# Patient Record
Sex: FEMALE | Race: WHITE
[De-identification: names, ages, dates, MRNs, and addresses within clinical notes are randomized per-mention and may not be internally consistent; named-entity substitution may affect disease eponyms.]

---

## 2021-07-21 LAB
ALBUMIN SERPL BCP-MCNC: 3.8 G/DL (ref 3.4–5)
ALBUMIN/GLOB SERPL: 1 {RATIO} (ref 1.1–1.5)
ALP SERPL-CCNC: 99 IU/L (ref 46–116)
ALT SERPL W P-5'-P-CCNC: 32 U/L (ref 30–65)
ANION GAP SERPL CALCULATED.3IONS-SCNC: 10 MMOL/L (ref 8–16)
AST SERPL W P-5'-P-CCNC: 19 U/L (ref 10–37)
BASOPHILS # BLD AUTO: 0 X10'3 (ref 0–0.2)
BASOPHILS NFR BLD AUTO: 0.3 % (ref 0–1)
BILIRUB SERPL-MCNC: 0.6 MG/DL (ref 0–1)
BUN SERPL-MCNC: 15 MG/DL (ref 7–18)
BUN/CREAT SERPL: 17 (ref 6.6–38)
CALCIUM SERPL-MCNC: 9.8 MG/DL (ref 8.5–10.1)
CHLORIDE SERPL-SCNC: 101 MMOL/L (ref 99–107)
CO2 SERPL-SCNC: 26.8 MMOL/L (ref 24–32)
CREAT SERPL-MCNC: 0.88 MG/DL (ref 0.4–0.9)
EOSINOPHIL # BLD AUTO: 0.1 X10'3 (ref 0–0.9)
EOSINOPHIL NFR BLD AUTO: 0.7 % (ref 0–6)
ERYTHROCYTE [DISTWIDTH] IN BLOOD BY AUTOMATED COUNT: 14.4 % (ref 11.5–14.5)
GFR SERPL CREATININE-BSD FRML MDRD: 68 ML/MIN
GLUCOSE SERPL-MCNC: 114 MG/DL (ref 70–104)
HCT VFR BLD AUTO: 41.6 % (ref 35–45)
HGB BLD-MCNC: 13.9 G/DL (ref 12–16)
LYMPHOCYTES # BLD AUTO: 2.1 X10'3 (ref 1.1–4.8)
LYMPHOCYTES NFR BLD AUTO: 23.8 % (ref 21–51)
MCH RBC QN AUTO: 31.2 PG (ref 27–31)
MCHC RBC AUTO-ENTMCNC: 33.4 G/DL (ref 33–36.5)
MCV RBC AUTO: 93.2 FL (ref 78–98)
MONOCYTES # BLD AUTO: 0.8 X10'3 (ref 0–0.9)
MONOCYTES NFR BLD AUTO: 8.7 % (ref 2–12)
NEUTROPHILS # BLD AUTO: 5.8 X10'3 (ref 1.8–7.7)
NEUTROPHILS NFR BLD AUTO: 66.5 % (ref 42–75)
PLATELET # BLD AUTO: 403 X10'3 (ref 140–440)
PMV BLD AUTO: 7.6 FL (ref 7.4–10.4)
POTASSIUM SERPL-SCNC: 3.8 MMOL/L (ref 3.4–5.1)
PROT SERPL-MCNC: 7.6 G/DL (ref 6.4–8.2)
RBC # BLD AUTO: 4.47 X10'6 (ref 4.2–5.6)
SODIUM SERPL-SCNC: 138 MMOL/L (ref 135–145)

## 2021-08-13 ENCOUNTER — HOSPITAL ENCOUNTER (OUTPATIENT)
Dept: HOSPITAL 94 - PAS | Age: 51
Discharge: HOME | End: 2021-08-13
Attending: ORTHOPAEDIC SURGERY
Payer: COMMERCIAL

## 2021-08-13 VITALS — SYSTOLIC BLOOD PRESSURE: 122 MMHG | DIASTOLIC BLOOD PRESSURE: 84 MMHG

## 2021-08-13 VITALS — SYSTOLIC BLOOD PRESSURE: 114 MMHG | DIASTOLIC BLOOD PRESSURE: 64 MMHG

## 2021-08-13 VITALS — DIASTOLIC BLOOD PRESSURE: 88 MMHG | SYSTOLIC BLOOD PRESSURE: 128 MMHG

## 2021-08-13 VITALS — SYSTOLIC BLOOD PRESSURE: 120 MMHG | DIASTOLIC BLOOD PRESSURE: 84 MMHG

## 2021-08-13 VITALS — BODY MASS INDEX: 47.15 KG/M2 | WEIGHT: 266.1 LBS | HEIGHT: 63 IN

## 2021-08-13 VITALS — SYSTOLIC BLOOD PRESSURE: 124 MMHG | DIASTOLIC BLOOD PRESSURE: 84 MMHG

## 2021-08-13 VITALS — DIASTOLIC BLOOD PRESSURE: 82 MMHG | SYSTOLIC BLOOD PRESSURE: 116 MMHG

## 2021-08-13 DIAGNOSIS — F41.1: ICD-10-CM

## 2021-08-13 DIAGNOSIS — I25.2: ICD-10-CM

## 2021-08-13 DIAGNOSIS — Z79.899: ICD-10-CM

## 2021-08-13 DIAGNOSIS — E66.9: ICD-10-CM

## 2021-08-13 DIAGNOSIS — G56.02: Primary | ICD-10-CM

## 2021-08-13 DIAGNOSIS — Z98.51: ICD-10-CM

## 2021-08-13 DIAGNOSIS — F32.9: ICD-10-CM

## 2021-08-13 DIAGNOSIS — G89.29: ICD-10-CM

## 2021-08-13 DIAGNOSIS — Z20.822: ICD-10-CM

## 2021-08-13 DIAGNOSIS — M19.90: ICD-10-CM

## 2021-08-13 DIAGNOSIS — I10: ICD-10-CM

## 2021-08-13 DIAGNOSIS — G47.33: ICD-10-CM

## 2021-08-13 LAB
ANION GAP BLD CALC-SCNC: 12 MMOL/L (ref 8–12)
BUN BLD-MCNC: 16 MG/DL (ref 7–18)
BUN/CREAT BLD: 20 (ref 6.6–38)
CA-I BLD-SCNC: 1.24 MMOL/L (ref 1.03–1.32)
CHLORIDE BLD-SCNC: 101 MMOL/L (ref 99–107)
CO2 BLD-SCNC: 28 MMOL/L (ref 24–32)
CREAT BLD-MCNC: 0.8 MG/DL (ref 0.6–1.1)
GFR SERPL CREATININE-BSD FRML MDRD: 76 ML/MIN
GLUCOSE SERPLBLD-MCNC: 106 MG/DL (ref 70–105)
HCT VFR BLD CALC: 43 %PCV (ref 35–48)
HGB BLD CALC-MCNC: 14.6 G/DL (ref 12–16)
POTASSIUM BLD-SCNC: 4.1 MMOL/L (ref 3.5–5.1)
SODIUM BLD-SCNC: 141 MMOL/L (ref 135–145)

## 2021-08-13 PROCEDURE — 85025 COMPLETE CBC W/AUTO DIFF WBC: CPT

## 2021-08-13 PROCEDURE — 80053 COMPREHEN METABOLIC PANEL: CPT

## 2021-08-13 PROCEDURE — 80047 BASIC METABLC PNL IONIZED CA: CPT

## 2021-08-13 PROCEDURE — 29848 WRIST ENDOSCOPY/SURGERY: CPT

## 2021-08-13 PROCEDURE — 87635 SARS-COV-2 COVID-19 AMP PRB: CPT

## 2021-08-13 PROCEDURE — 36415 COLL VENOUS BLD VENIPUNCTURE: CPT

## 2021-08-13 NOTE — NUR
PT UP AND ABLE TO SAFELY AMBULATE, PT COMFORTABLE. D/C INSTRUCTIONS GIVEN AND GONE OVER W/PT 
WHO VERBALIZED UNDERSTANDING. PT D/CD TO HOME VIA W/C TO PRIVATE VEHICLE W/O INCIDENT.

-------------------------------------------------------------------------------

Addendum: 08/13/21 at 1603 by Carissa Mitchell RN

-------------------------------------------------------------------------------

Amended: Links added.

## 2021-08-13 NOTE — NUR
Received from OR via [facility], accompanied by Anesthesiologist DR OLIVAREZ and report given by 
Anesthesiologist. PT AWAKE, DENIES PAIN, LEFT HAND TO WRIST W/BIAS DRSG COVERING 
INCISION/DRSG CDI. FINGERS PWD, CRT 1-2 SECONDS.

-------------------------------------------------------------------------------

Addendum: 08/13/21 at 1559 by Carissa Mitchell RN

-------------------------------------------------------------------------------

Amended: Links added.

## 2021-11-18 ENCOUNTER — HOSPITAL ENCOUNTER (EMERGENCY)
Dept: HOSPITAL 94 - ER | Age: 51
Discharge: HOME | End: 2021-11-18
Payer: COMMERCIAL

## 2021-11-18 VITALS — WEIGHT: 260.54 LBS | HEIGHT: 63 IN | BODY MASS INDEX: 46.16 KG/M2

## 2021-11-18 VITALS — DIASTOLIC BLOOD PRESSURE: 107 MMHG | SYSTOLIC BLOOD PRESSURE: 184 MMHG

## 2021-11-18 DIAGNOSIS — I10: ICD-10-CM

## 2021-11-18 DIAGNOSIS — Z79.899: ICD-10-CM

## 2021-11-18 DIAGNOSIS — Z98.51: ICD-10-CM

## 2021-11-18 DIAGNOSIS — I25.2: ICD-10-CM

## 2021-11-18 DIAGNOSIS — G51.0: Primary | ICD-10-CM

## 2021-11-18 DIAGNOSIS — I25.10: ICD-10-CM

## 2021-11-18 DIAGNOSIS — R53.1: ICD-10-CM

## 2021-11-18 PROCEDURE — 99283 EMERGENCY DEPT VISIT LOW MDM: CPT

## 2021-11-18 PROCEDURE — 93005 ELECTROCARDIOGRAM TRACING: CPT
